# Patient Record
Sex: FEMALE | Race: WHITE
[De-identification: names, ages, dates, MRNs, and addresses within clinical notes are randomized per-mention and may not be internally consistent; named-entity substitution may affect disease eponyms.]

---

## 2018-04-04 ENCOUNTER — HOSPITAL ENCOUNTER (OUTPATIENT)
Dept: HOSPITAL 61 - US | Age: 83
Discharge: HOME | End: 2018-04-04
Attending: INTERNAL MEDICINE
Payer: MEDICARE

## 2018-04-04 DIAGNOSIS — N28.1: Primary | ICD-10-CM

## 2018-04-04 DIAGNOSIS — I10: ICD-10-CM

## 2018-04-04 DIAGNOSIS — E78.5: ICD-10-CM

## 2018-04-04 PROCEDURE — 76700 US EXAM ABDOM COMPLETE: CPT

## 2018-07-26 VITALS
SYSTOLIC BLOOD PRESSURE: 154 MMHG | DIASTOLIC BLOOD PRESSURE: 64 MMHG | SYSTOLIC BLOOD PRESSURE: 154 MMHG | DIASTOLIC BLOOD PRESSURE: 64 MMHG | SYSTOLIC BLOOD PRESSURE: 154 MMHG | SYSTOLIC BLOOD PRESSURE: 154 MMHG | DIASTOLIC BLOOD PRESSURE: 64 MMHG

## 2018-12-14 ENCOUNTER — HOSPITAL ENCOUNTER (OUTPATIENT)
Dept: HOSPITAL 61 - US | Age: 83
Discharge: HOME | End: 2018-12-14
Attending: INTERNAL MEDICINE
Payer: MEDICARE

## 2018-12-14 DIAGNOSIS — N28.1: Primary | ICD-10-CM

## 2018-12-14 DIAGNOSIS — I12.9: ICD-10-CM

## 2018-12-14 DIAGNOSIS — Z79.899: ICD-10-CM

## 2018-12-14 DIAGNOSIS — N18.3: ICD-10-CM

## 2018-12-14 PROCEDURE — 76770 US EXAM ABDO BACK WALL COMP: CPT

## 2018-12-14 NOTE — RAD
Indication: Chronic kidney disease stage III. Follow-up of right renal 

cyst ultrasound.

 

TECHNIQUE: Grayscale, color Doppler images of the kidneys.

 

COMPARISON: Ultrasound abdomen from 4/4/2018

 

FINDINGS:

There is a lobulated anechoic lesion in the upper pole of the right kidney

measuring 3.1 x 4.1 x 4.5 cm, previously 5.6 x 3.8 x 6.0 cm without 

internal vascularity, septations or solid mural nodularity. The right 

kidney demonstrates no hydronephrosis. The right kidney measures 10.2 x 

4.3 x 5.7 cm (longitudinal, AP, transverse).

 

The left kidney measures 10.6 x 5.3 x 4.3 cm without hydronephrosis but 

with diffusely thin cortex.

 

Visualized bladder within normal limits.

 

IMPRESSION:

1. Slight interval decrease in the size of right upper pole simple renal 

cyst.

2. Evidence of chronic kidney disease. No hydronephrosis.

 

Electronically signed by: Milo Russell DO (12/14/2018 3:15 PM) [facility]

## 2019-04-11 ENCOUNTER — HOSPITAL ENCOUNTER (OUTPATIENT)
Dept: HOSPITAL 61 - US | Age: 84
Discharge: HOME | End: 2019-04-11
Attending: INTERNAL MEDICINE
Payer: MEDICARE

## 2019-04-11 DIAGNOSIS — I08.1: Primary | ICD-10-CM

## 2019-04-11 DIAGNOSIS — I27.20: ICD-10-CM

## 2019-04-11 DIAGNOSIS — I25.10: ICD-10-CM

## 2019-04-11 DIAGNOSIS — I87.2: ICD-10-CM

## 2019-04-11 PROCEDURE — 93970 EXTREMITY STUDY: CPT

## 2019-04-11 PROCEDURE — 93306 TTE W/DOPPLER COMPLETE: CPT

## 2019-04-11 NOTE — RAD
MR#: [id number]

Account#: [id number]

Accession#: [id number]

Date of Study: 04/11/2019

Ordering Physician: EDWIN FAIRCHILD, 

Referring Physician: EDWIN FAIRCHILD, 

Tech: Lesa Tyler RDMS, YANGT, RTR





--------------- APPROVED REPORT --------------





Patient Location : OUT-PATIENT



Indications

Lower Extremity Edema :     Bilateral



Greater Saphenous Veins (GSV)

Significant venous relux noted in the RIGHT GSV at the following levels : Superficial Femoral Junctio
n, Proximal Thigh, Mid Thigh, Mid Thigh, Proximal Calf, Mid Calf, Distal Calf

Significant venous relux noted in the LEFT GSV at the following levels : Superficial Femoral Junction
, Proximal Thigh, Mid Thigh, Distal Thigh



Lesser Saphenous Veins (LSV)

Significant venous reflux is noted in the Right LSV.



Findings

Limited grayscale images of the saphenofemoral junctions do not reveal any obvious evidence of thromb
us.



The right great saphenous vein measures approximately 5.9 mm and has a reflux time of 2.7 seconds. Th
e left great saphenous vein measures approximately 6 mm and has a reflux time of 2.6 seconds.



The right small saphenous vein measures approximately 2.7 mm and has a reflux time of 4 seconds. The 
left lesser saphenous vein is not well visualized appears to be thrombosed.



Critical Notification

Critical Value: No



<Conclusion>

Positive for reflux in the bilateral greater and right lesser saphenous vein. The left lesser sapheno
us vein appears to be occluded.



Signed by : Jered Mendoza, 

Electronically Approved : 04/11/2019 10:18:41

## 2019-04-11 NOTE — RAD
MR#: [id number]

Account#: [id number]

Accession#: [id number]

Date of Study: 04/11/2019

Ordering Physician: EDWIN FAIRCHILD, 

Referring Physician: EDWIN FAIRCHILD, 

Tech: Yolanda Ramos RVT, [facility]





--------------- APPROVED REPORT --------------





Bilateral Lower Extremity Venous Study for CAD/ Leg Swelling

Patient Location: OUT-PATIENT



Indications

Bilateral LE Swelling



Vein Imaging (Right)

CFV (R): Compressible

SFJ (R): Compressible

FEM (R): Compressible

POP (R): Compressible

DFV (R): Compressible

PTV (R): Compressible

GSV (R): Compressible

Peroneals (R): Compressible



Vein Imaging (Left)

CFV (L): Compressible

SFJ (L): Compressible

FEM (L): Compressible

POP (L): Compressible

DFV (L): Compressible

PTV (L): Compressible

GSV (L): Compressible

Peroneals (L): Compressible



Doppler Evaluation (Right)

CFV (R): Phasic

POP (R):Phasic



Doppler Evaluation (Left)

CFV (L):Phasic

POP (L):Phasic



Findings

The bilateral lower extremity deep veins were evaluated for thrombus with color Doppler, spectral and
 grayscale images.



On the right the grayscale images of the common femoral, superficial femoral and popliteal veins do n
ot demonstrate any evidence of thrombus and these veins appear to be compressible. The below-knee vei
ns were not well visualized but grossly appear to be compressible. Spectral imaging and color Doppler
 do not reveal any evidence of obstruction to flow with normal respirophasic variation above the knee
. Below the knee there is spontaneous flow noted.



On the left, the grayscale images of the common femoral, superficial femoral and popliteal veins do n
ot demonstrate any evidence of thrombus and these veins appear to be compressible. The below-knee vei
ns again were not well visualized but grossly appear to be compressible. Spectral imaging and color D
oppler do not reveal any evidence of obstruction to flow with normal respirophasic variation above th
e knee. The below-knee veins demonstrate spontaneous flow.



Critical Notification

Critical Value: No



<Conclusion>

Negative for DVT in the bilateral lower extremities.



Signed by : Jered Mendoza, 

Electronically Approved : 04/11/2019 10:25:02

## 2019-04-11 NOTE — CARD
MR#: [id number]

Account#: [id number]

Accession#: [id number]

Date of Study: 04/11/2019

Ordering Physician: EDWIN FAIRCHILD, 

Referring Physician: EDWIN FAIRCHILD, 

Tech: Lu Mcallister [facility]





--------------- APPROVED REPORT --------------





EXAM: Two-dimensional and M-mode echocardiogram with Doppler and color Doppler.



Other Information 

Quality : GoodHR: 55bpm

Rhythm : Bradycardia



INDICATION

Congestive Heart Failure 



2D DIMENSIONS 

RVDd3.5 (2.9-3.5cm)Left Atrium(2D)2.5 (1.6-4.0cm)

IVSd0.9 (0.7-1.1cm)Aortic Root(2D)2.9 (2.0-3.7cm)

LVDd3.5 (3.9-5.9cm)LVOT Diameter1.9 (1.8-2.4cm)

PWd0.8 (0.7-1.1cm)LVDs2.4 (2.5-4.0cm)

FS (%) 30.2 %SV29.7 ml

LVEF(%)58.6 (>50%)



M-Mode DIMENSIONS 

Left Atrium(MM)3.74 (2.5-4.0cm)Aortic Root2.82 (2.2-3.7cm)



Aortic Valve

AoV Peak Jeffrey.111.1cm/sAoV VTI28.3cm

AO Peak GR.4.9mmHgLVOT Peak Jeffrey.101.9cm/s

AO Mean GR.2mmHgAVA (VMAX)2.54cm2

ELROY   (VTI)2.50cm2



Mitral Valve

MV E Uvupvgza80.0cm/sMV DECEL IFFC920ae

MV A Lwtvatcw78.5cm/sE/A  Ratio1.3



Pulmonary Valve

PV Peak Kofmctbd23.8cm/s



Tricuspid Valve

TR P. Mkcjfzse295np/sRAP EFAGBBDN6wiYt

TR Peak Gr.17yfKoCTSY71egJf



 LEFT VENTRICLE 

The left ventricle is normal size. There is normal left ventricular wall thickness. The left ventricu
lar systolic function is normal and the ejection fraction is within normal range. The Ejection Fracti
on is 55-60%. There is normal LV segmental wall motion. Transmitral Doppler flow pattern is Grade II-
pseudonormal filling dynamics.



 RIGHT VENTRICLE 

The right ventricle is mildly dilated. There is normal right ventricular wall thickness. The right ve
ntricular systolic function is normal.



 ATRIA 

The left atrium size is normal. The right atrium size is normal. The interatrial septum is intact wit
h no evidence for an atrial septal defect or patent foramen ovale as noted on 2-D or Doppler imaging.




 AORTIC VALVE 

The aortic valve is mildly calcified. The aortic valve is trileaflet. Doppler and Color Flow revealed
 no significant aortic regurgitation. There is no significant aortic valvular stenosis.



 MITRAL VALVE 

The mitral valve is calcified but opens well. There is no evidence of mitral valve prolapse. There is
 no mitral valve stenosis. Doppler and Color-flow revealed mild mitral regurgitation.



 TRICUSPID VALVE 

The tricuspid valve is normal in structure and function. Doppler and Color Flow revealed mild tricusp
id regurgitation.There is mild pulmonary hypertension.The PA pressure was estimated at 39 mmHg. There
 is no tricuspid valve prolapse or vegetation. There is no tricuspid valve stenosis.



 PULMONIC VALVE 

Doppler and Color Flow revealed trace pulmonic valvular regurgitation. There is no pulmonic valvular 
stenosis.



 GREAT VESSELS 

The aortic root is normal in size. The ascending aorta is normal in size. The IVC is normal in size a
nd collapses >50% with inspiration.



 PERICARDIAL EFFUSION 

There is no evidence of significant pericardial effusion.



Critical Notification

Critical Value: No



<Conclusion>

The left ventricular systolic function is normal and the ejection fraction is within normal range. Th
e Ejection Fraction is 55-60%.

There is normal LV segmental wall motion.

Doppler and Color Flow revealed mild tricuspid regurgitation.There is mild pulmonary hypertension.The
 PA pressure was estimated at 39 mmHg.



Signed by : Jered Mendoza, 

Electronically Approved : 04/11/2019 09:28:52

## 2021-04-23 ENCOUNTER — HOSPITAL ENCOUNTER (OUTPATIENT)
Dept: HOSPITAL 61 - LAB | Age: 86
End: 2021-04-23
Attending: SURGERY
Payer: MEDICARE

## 2021-04-23 DIAGNOSIS — Z01.812: Primary | ICD-10-CM

## 2021-04-23 DIAGNOSIS — R92.8: ICD-10-CM

## 2021-04-23 DIAGNOSIS — Z20.822: ICD-10-CM

## 2021-04-23 PROCEDURE — U0003 INFECTIOUS AGENT DETECTION BY NUCLEIC ACID (DNA OR RNA); SEVERE ACUTE RESPIRATORY SYNDROME CORONAVIRUS 2 (SARS-COV-2) (CORONAVIRUS DISEASE [COVID-19]), AMPLIFIED PROBE TECHNIQUE, MAKING USE OF HIGH THROUGHPUT TECHNOLOGIES AS DESCRIBED BY CMS-2020-01-R: HCPCS

## 2021-04-28 ENCOUNTER — HOSPITAL ENCOUNTER (OUTPATIENT)
Dept: HOSPITAL 61 - SURG | Age: 86
Discharge: HOME | End: 2021-04-28
Attending: SURGERY
Payer: MEDICARE

## 2021-04-28 ENCOUNTER — HOSPITAL ENCOUNTER (OUTPATIENT)
Dept: HOSPITAL 61 - MAMMO | Age: 86
Discharge: HOME | End: 2021-04-28
Attending: SURGERY
Payer: MEDICARE

## 2021-04-28 VITALS — WEIGHT: 136.69 LBS | HEIGHT: 64 IN | BODY MASS INDEX: 23.34 KG/M2

## 2021-04-28 VITALS — DIASTOLIC BLOOD PRESSURE: 74 MMHG | DIASTOLIC BLOOD PRESSURE: 74 MMHG | SYSTOLIC BLOOD PRESSURE: 154 MMHG

## 2021-04-28 DIAGNOSIS — Z79.899: ICD-10-CM

## 2021-04-28 DIAGNOSIS — D05.11: ICD-10-CM

## 2021-04-28 DIAGNOSIS — I25.10: ICD-10-CM

## 2021-04-28 DIAGNOSIS — N63.10: Primary | ICD-10-CM

## 2021-04-28 DIAGNOSIS — R92.8: ICD-10-CM

## 2021-04-28 DIAGNOSIS — I11.0: ICD-10-CM

## 2021-04-28 DIAGNOSIS — E78.00: ICD-10-CM

## 2021-04-28 DIAGNOSIS — Z79.4: ICD-10-CM

## 2021-04-28 DIAGNOSIS — Z88.0: ICD-10-CM

## 2021-04-28 DIAGNOSIS — R92.8: Primary | ICD-10-CM

## 2021-04-28 DIAGNOSIS — Z79.82: ICD-10-CM

## 2021-04-28 DIAGNOSIS — N60.11: ICD-10-CM

## 2021-04-28 DIAGNOSIS — N60.01: ICD-10-CM

## 2021-04-28 DIAGNOSIS — Z85.828: ICD-10-CM

## 2021-04-28 DIAGNOSIS — Z88.1: ICD-10-CM

## 2021-04-28 DIAGNOSIS — Z90.710: ICD-10-CM

## 2021-04-28 DIAGNOSIS — Z98.890: ICD-10-CM

## 2021-04-28 DIAGNOSIS — E03.9: ICD-10-CM

## 2021-04-28 DIAGNOSIS — N63.10: ICD-10-CM

## 2021-04-28 DIAGNOSIS — Z88.2: ICD-10-CM

## 2021-04-28 DIAGNOSIS — N60.41: ICD-10-CM

## 2021-04-28 DIAGNOSIS — N60.21: ICD-10-CM

## 2021-04-28 DIAGNOSIS — K21.9: ICD-10-CM

## 2021-04-28 DIAGNOSIS — I50.9: ICD-10-CM

## 2021-04-28 PROCEDURE — 19125 EXCISION BREAST LESION: CPT

## 2021-04-28 PROCEDURE — A6254 ABSORPT DRG <=16 SQ IN W/BDR: HCPCS

## 2021-04-28 PROCEDURE — A4364 ADHESIVE, LIQUID OR EQUAL: HCPCS

## 2021-04-28 PROCEDURE — 88305 TISSUE EXAM BY PATHOLOGIST: CPT

## 2021-04-28 PROCEDURE — A4452 WATERPROOF TAPE: HCPCS

## 2021-04-28 PROCEDURE — 88342 IMHCHEM/IMCYTCHM 1ST ANTB: CPT

## 2021-04-28 PROCEDURE — 19285 PERQ DEV BREAST 1ST US IMAG: CPT

## 2021-04-28 PROCEDURE — 77065 DX MAMMO INCL CAD UNI: CPT

## 2021-04-28 PROCEDURE — 88341 IMHCHEM/IMCYTCHM EA ADD ANTB: CPT

## 2021-04-28 PROCEDURE — A6402 STERILE GAUZE <= 16 SQ IN: HCPCS

## 2021-04-28 PROCEDURE — A6258 TRANSPARENT FILM >16<=48 IN: HCPCS

## 2021-04-28 NOTE — PDOC4
Operative Note


Operative Note


Operative Note:





Preoperative Diagnosis: Right breast mass x2





Postoperative Diagnosis: Same





Procedure: Right excisional breast biopsy with needle localization





Surgeon: Dariusz





Assistant:  Zoila GRADY





Anesthesia: General





EBL: 10 mL





Specimen: Right breast biopsy to pathology





Drains: None





Complications: None





Indication: The patient is an 88-year-old female who is referred following 

recent mammographic and sonographic evaluation of the right breast.  This 

identified to retroareolar small nodules consistent with intraductal papillomas.

 Recommendation was made for biopsy for definitive tissue diagnosis.  The risks 

of surgery were discussed with the patient which include bleeding, infection, 

pain, scar tissue, wound healing problems, anesthetic risk, potential need for 

additional surgery procedure.  She understands and would like to proceed





Description: The patient initially went to radiology where she underwent needle 

localization of the involved area.  She was then brought to the operating room 

and placed supine in the operating table.  General anesthesia was performed.  

The right breast was prepped with ChloraPrep and draped in a standard surgical 

manner.  The wire was entering near the lateral aspect a few centimeters from 

the nipple.  A curved lateral periareolar incision was made with a scalpel.  Cau

benjamin dissection was carried down to the breast parenchyma.  The wire was 

identified and followed until its distal tip.  A combination of sharp and 

cautery dissection was used.  The wire terminated in the superficial 

retroareolar tissues.  We were able to elevate the skin of the nipple and 

mobilize the localized tissues.  Cautery was used for full excision of the 

involved tissue which was sent to pathology.  Hemostasis was achieved with 

cautery.  The skin was approximated with 4-0 Monocryl and infiltrated with half 

percent Marcaine with epinephrine.  Steri-Strips and a sterile dressing were 

applied.  The patient tolerated the procedure well and was sent to the recovery 

room in stable condition.  At the end of the case all counts were correct.











MARICHUY GONZALEZ MD             Apr 28, 2021 12:51

## 2021-04-28 NOTE — RAD
EXAM: Sonographic guided right breast needle-wire localization; right breast post localization mammog
john.



HISTORY: 80-year-old female presents for needle-wire localization of suspected papilloma is within th
e right breast demonstrated on a prior sonogram.



TECHNIQUE: The risks of the procedure were discussed with the patient and written and and verbal cons
ent was obtained. A timeout was performed. Significant imaging of the right breast was performed and 
the dilated ducts containing solid-appearing nodules within the subareolar location were identified. 
The skin in this location was sterilely prepped, draped and infiltrated with 1 percent lidocaine. A n
eedle wire system was advanced through both nodules using sonographic guidance and the wire was deplo
yed. The wire secured to the skin surface. A post biopsy mammogram confirms positioning of the wire w
ithin the 9:00 subareolar aspect of the right breast. The patient tolerated the procedure without com
plication and was transferred to the operative suite in stable condition.



IMPRESSION:

1. Successful sonographic guided needle-wire localization of small solid-appearing nodules within dil
ated ducts within the 9:00 subareolar aspect of the right breast.

2. Note is made that there are several additional small solid-appearing nodules within dilated ducts 
throughout the cerebral aspect of the right breast, the appearance of which favors papillomatosis. Th
e superimposed on intraductal debris. The largest nodules demonstrated on the prior sonogram were tar
geted for localization and tissue sampling.



Electronically signed by: Jessica Lou MD (4/28/2021 3:24 PM) [id number]

## 2021-04-28 NOTE — DISCH
DISCHARGE INSTRUCTIONS


Condition on Discharge


Condition on Discharge:  Stable





Activity After Discharge


Activity Instructions for Disc:  Resume previous activity





Diet after Discharge


Diet after Discharge:  Regular





Wound Incision Care


Wound/Incision Care:  Other, see below (keep dressing clean and dry)





Follow-Up


Follow up with:  Dr Gonzalez in 1 week in office, call for appointment 

[phone]











MARICHUY GONZALEZ MD             Apr 28, 2021 12:53

## 2021-04-28 NOTE — RAD
EXAM: Sonographic guided right breast needle-wire localization; right breast post localization mammog
john.



HISTORY: 80-year-old female presents for needle-wire localization of suspected papilloma is within th
e right breast demonstrated on a prior sonogram.



TECHNIQUE: The risks of the procedure were discussed with the patient and written and and verbal cons
ent was obtained. A timeout was performed. Significant imaging of the right breast was performed and 
the dilated ducts containing solid-appearing nodules within the subareolar location were identified. 
The skin in this location was sterilely prepped, draped and infiltrated with 1 percent lidocaine. A n
eedle wire system was advanced through both nodules using sonographic guidance and the wire was deplo
yed. The wire secured to the skin surface. A post biopsy mammogram confirms positioning of the wire w
ithin the 9:00 subareolar aspect of the right breast. The patient tolerated the procedure without com
plication and was transferred to the operative suite in stable condition.



IMPRESSION:

1. Successful sonographic guided needle-wire localization of small solid-appearing nodules within dil
ated ducts within the 9:00 subareolar aspect of the right breast.

2. Note is made that there are several additional small solid-appearing nodules within dilated ducts 
throughout the cerebral aspect of the right breast, the appearance of which favors papillomatosis. Th
e superimposed on intraductal debris. The largest nodules demonstrated on the prior sonogram were tar
geted for localization and tissue sampling.



Electronically signed by: Jessica Lou MD (4/28/2021 3:24 PM) [id number]

## 2021-05-06 NOTE — PATHOLOGY
[facility] Accession Number: [id number]

.                                                                01

Material submitted:                                        .

breast - RIGHT BREAST BIOPSY. Modifiers: right

.                                                                01

Clinical history:                                          .

ABNORMAL MAMMOGRAM

RIGHT BREAST BIOPSY WITH NEEDLE LOCALIZATION

.                                                                02

**********************************************************************

Diagnosis:

Breast "right", biopsy with needle localization:

  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

  - DCIS INVOLVES RESECTION MARGIN.

  - Fibrocystic changes including cyst formation, sclerosing adenosis, and

     duct ectasia.

  - Coarse microcalcifications associated with DCIS.

  - Please see cancer summary below.

.

SURGICAL PATHOLOGY CANCER CASE SUMMARY

Breast DCIS (V 1.0.0.1)

.

Procedure:  Biopsy with needle localization

Specimen Laterality:  Right

Tumor Site:  Not specified

Histologic Type:  Ductal carcinoma in situ (DCIS)

Architectural Patterns:  Cribriform, micropapillary, solid

Nuclear Grade:  Grade II (intermediate)

Necrosis:  Not identified

Microcalcifications:  Present in DCIS

Biomarker:  Not performed.

(MLK:pit; 05/05/2021)

QTP  05/05/2021  1457 Local

**********************************************************************

.                                                                02

Comment:

The case is seen in co-review with Dr. Rohit Hines who concurs with

the above diagnosis.

.

The case was prepared and proofread by Dr. Johnson and electronically

released by Dr. Kerley.

.                                                                02

Electronically signed:                                     .

Spencer Wells Kerley, MD, Pathologist

NPI- [id number]

.                                                                01

Gross description:                                         .

Fixative: Formalin

Labeled: R breast biopsy

Specimen received: Unoriented portion of yellow-tan tissue

Oriented: No

Dimensions: 2.7 x 2.7 x 1.3 cm

Weight: 3 g

The specimen is inked black.

Number of slices: 7

Lesion: 2.5 x 1.3 x 0.7 cm area of tan-white fibrous/cystic tissue

Lesion location: Slices 2-6

The lesion grossly abuts the inked margin.

Biopsy clip: None identified

Uninvolved breast parenchyma: Yellow and lobulated

.

The specimen is submitted as follows:

A1 slice 1, perpendicular sections

A2 slice 2-3

A3 slice 4-5

A4 slice 6

A5 slice 7, perpendicular sections

.

The specimen is removed from the patient at 1220 and placed in formalin at

1225 on 4/28/2021. The specimen is removed from formalin at 1850 on

4/29/2021. The specimen is in formalin for greater than 6 hours and less

than 72 hours. ([facility]; 4/29/2021)

[facility]  04/29/2021  0857 Local

.                                                                02

Microscopic:                                                    .

Immunohistochemical stain results (properly controlled)

  P63(A1, A2, A3, A4, A5) - Highlights myoepithelial cells, and notable

                             absence of myoepithelial cells in areas of

                             DCIS.

  Myosin(A1, A2, A3, A4, A5) - Highlights myoepithelial cells, notable

                                absence of myoepithelial cells in areas

                                of DCIS.

  E-cadherin (A1, A2, A3, A4, A5) - Highlights ductal epithelial cells.

(MLK:pit; 05/05/2021)

.                                                                02

Pathologist provided ICD-10:

D05.11, N60.11, N60.01, N60.21, N60.41

.                                                                02

CPT                                                        .

604878, [id number], [id number]

Specimen Comment: A courtesy copy of this report has been sent to [phone]

Specimen Comment: Report sent to 

***Performed at:  01

   [facility]

   [address], [place]  [id number]

   MD Ethan Vogel MD Phone:  [phone]

***Performed at:  02

   [facility]

   [address]  [id number]

   MD Spencer Kerley MD Phone:  [phone]

## 2021-06-20 ENCOUNTER — HOSPITAL ENCOUNTER (EMERGENCY)
Dept: HOSPITAL 61 - ER | Age: 86
Discharge: STILL ADMITTED OR ADMINISTRATIVE | End: 2021-06-20
Payer: MEDICARE

## 2021-06-20 VITALS — WEIGHT: 130.29 LBS | HEIGHT: 63 IN | BODY MASS INDEX: 23.09 KG/M2

## 2021-06-20 DIAGNOSIS — E78.00: ICD-10-CM

## 2021-06-20 DIAGNOSIS — E03.9: ICD-10-CM

## 2021-06-20 DIAGNOSIS — I10: Primary | ICD-10-CM

## 2021-06-20 DIAGNOSIS — Z88.2: ICD-10-CM

## 2021-06-20 DIAGNOSIS — K21.9: ICD-10-CM

## 2021-06-20 DIAGNOSIS — Z88.0: ICD-10-CM

## 2021-06-20 LAB
ALBUMIN SERPL-MCNC: 3.8 G/DL (ref 3.4–5)
ALBUMIN/GLOB SERPL: 0.9 {RATIO} (ref 1–1.7)
ALP SERPL-CCNC: 120 U/L (ref 46–116)
ALT SERPL-CCNC: 24 U/L (ref 14–59)
ANION GAP SERPL CALC-SCNC: 8 MMOL/L (ref 6–14)
APTT PPP: YELLOW S
AST SERPL-CCNC: 29 U/L (ref 15–37)
BACTERIA #/AREA URNS HPF: 0 /HPF
BASOPHILS # BLD AUTO: 0 X10^3/UL (ref 0–0.2)
BASOPHILS NFR BLD: 1 % (ref 0–3)
BILIRUB SERPL-MCNC: 0.4 MG/DL (ref 0.2–1)
BILIRUB UR QL STRIP: NEGATIVE
BUN SERPL-MCNC: 16 MG/DL (ref 7–20)
BUN/CREAT SERPL: 18 (ref 6–20)
CALCIUM SERPL-MCNC: 9.1 MG/DL (ref 8.5–10.1)
CHLORIDE SERPL-SCNC: 103 MMOL/L (ref 98–107)
CO2 SERPL-SCNC: 31 MMOL/L (ref 21–32)
CREAT SERPL-MCNC: 0.9 MG/DL (ref 0.6–1)
EOSINOPHIL NFR BLD: 0.2 X10^3/UL (ref 0–0.7)
EOSINOPHIL NFR BLD: 5 % (ref 0–3)
ERYTHROCYTE [DISTWIDTH] IN BLOOD BY AUTOMATED COUNT: 13.1 % (ref 11.5–14.5)
FIBRINOGEN PPP-MCNC: CLEAR MG/DL
GFR SERPLBLD BASED ON 1.73 SQ M-ARVRAT: 59.1 ML/MIN
GLUCOSE SERPL-MCNC: 109 MG/DL (ref 70–99)
HCT VFR BLD CALC: 38.3 % (ref 36–47)
HGB BLD-MCNC: 13.1 G/DL (ref 12–15.5)
LYMPHOCYTES # BLD: 1 X10^3/UL (ref 1–4.8)
LYMPHOCYTES NFR BLD AUTO: 21 % (ref 24–48)
MCH RBC QN AUTO: 35 PG (ref 25–35)
MCHC RBC AUTO-ENTMCNC: 34 G/DL (ref 31–37)
MCV RBC AUTO: 102 FL (ref 79–100)
MONO #: 0.5 X10^3/UL (ref 0–1.1)
MONOCYTES NFR BLD: 11 % (ref 0–9)
NEUT #: 2.9 X10^3/UL (ref 1.8–7.7)
NEUTROPHILS NFR BLD AUTO: 62 % (ref 31–73)
NITRITE UR QL STRIP: NEGATIVE
PH UR STRIP: 7 [PH]
PLATELET # BLD AUTO: 206 X10^3/UL (ref 140–400)
POTASSIUM SERPL-SCNC: 4.1 MMOL/L (ref 3.5–5.1)
PROT SERPL-MCNC: 8 G/DL (ref 6.4–8.2)
PROT UR STRIP-MCNC: NEGATIVE MG/DL
RBC # BLD AUTO: 3.76 X10^6/UL (ref 3.5–5.4)
RBC #/AREA URNS HPF: (no result) /HPF (ref 0–2)
SODIUM SERPL-SCNC: 142 MMOL/L (ref 136–145)
UROBILINOGEN UR-MCNC: 0.2 MG/DL
WBC # BLD AUTO: 4.7 X10^3/UL (ref 4–11)
WBC #/AREA URNS HPF: (no result) /HPF (ref 0–4)

## 2021-06-20 PROCEDURE — 36415 COLL VENOUS BLD VENIPUNCTURE: CPT

## 2021-06-20 PROCEDURE — 93005 ELECTROCARDIOGRAM TRACING: CPT

## 2021-06-20 PROCEDURE — 81001 URINALYSIS AUTO W/SCOPE: CPT

## 2021-06-20 PROCEDURE — 96376 TX/PRO/DX INJ SAME DRUG ADON: CPT

## 2021-06-20 PROCEDURE — 85025 COMPLETE CBC W/AUTO DIFF WBC: CPT

## 2021-06-20 PROCEDURE — 96374 THER/PROPH/DIAG INJ IV PUSH: CPT

## 2021-06-20 PROCEDURE — 80053 COMPREHEN METABOLIC PANEL: CPT

## 2021-06-20 PROCEDURE — 84484 ASSAY OF TROPONIN QUANT: CPT

## 2021-06-20 PROCEDURE — 99285 EMERGENCY DEPT VISIT HI MDM: CPT

## 2021-06-20 NOTE — PHYS DOC
Past Medical History


Past Medical History:  GERD, High Cholesterol, Hypertension, Hypothyroid, Other


Additional Past Medical Histor:  SHINGLES, CELLULITIS


Past Surgical History:  Other


Additional Past Surgical Histo:  LIVER SURGERY,BASAL CELL CA NOSE,HEART CATH,


Smoking Status:  Never Smoker


Alcohol Use:  None


Drug Use:  None





General Adult


EDM:


Chief Complaint:  HYPERTENSION





HPI:


HPI:





Patient is a 88  year old female past medical history hypothyroid presents with 

a chief complaint of hypertension.  Prior to arrival patient was watching the 

[place] game and decided to take her blood pressure.  Blood pressure on the 

monitor was greater than 200/100 systolic.  She denied any associated symptoms 

such as headache chest pain or shortness of breath.  Patient's initial blood 

pressure shortly after arrival was 170 systolic.  At the time of my exam patient

blood pressure was again elevated at 220/120 systolic.





Review of Systems:


Constitutional symptoms-  No fever, no chills.


Eyes- No Discharge, No Visual Loss


Respiratory symptoms-  No shortness of breath, No wheezing, No Dyspnea on 

Exertion


Cardiovascular Systems; No chest pain, No Palpitations, No syncope


Gastrointestinal symptoms:  NO abdominal pain, no nausea, no vomiting or 

diarrhea.


Genitourinary symptoms:  No dysuria.  


Musculoskeletal symptoms:  No back pain  No extremity pain.


NEUROLOGICAL Symptoms:  No headache, no generalized weakness; No focal Weakness


Skin: No rash.





Heart Score:


C/O Chest Pain:  N/A


Risk Factors:


Risk Factors:  DM, Current or recent (<one month) smoker, HTN, HLP, family 

history of CAD, obesity.


Risk Scores:


Score 0 - 3:  2.5% MACE over next 6 weeks - Discharge Home


Score 4 - 6:  20.3% MACE over next 6 weeks - Admit for Clinical Observation


Score 7 - 10:  72.7% MACE over next 6 weeks - Early Invasive Strategies





Current Medications:





Current Medications








 Medications


  (Trade)  Dose


 Ordered  Sig/Tristan  Start Time


 Stop Time Status Last Admin


Dose Admin


 


 Sodium Chloride  1,000 ml @ 


 1,000 mls/hr  1X  ONCE  6/20/21 18:30


 6/20/21 19:29   














Allergies:


Allergies:





Allergies








Coded Allergies Type Severity Reaction Last Updated Verified


 


  Penicillins Allergy Intermediate HIVES 4/28/21 Yes


 


  Sulfa (Sulfonamide Antibiotics) Allergy Intermediate HIVES/RASH 4/28/21 Yes











Physical Exam:


PE:


General: alert, no acute distress.


Skin: warm, dry and intact.


HENT: bilateral external ears normal, oropharynx moist, nose normal.


Head::  Normocephalic, atraumatic.


Neck:   Trachea midline.


Eyes: EOMI, Normal conjunctiva, No drainage


CARDIOVASCULAR:  Regular rate and rhythm


RESPIRATORY:  No respiratory distress


Back:  Full range of motion.


Skin: Warm, dry, no erythema, no rash. 


MUSCULOSKELETAL:  Full range of motion of bilateral upper and lower extremities.


GASTROINTESTINAL: Abdomen soft without rebound or guarding.


NEUROLOGICAL:  Alert and noted to person, place and time.  No neurological 

deficits observed


Psychiatric:  Cooperative.  Normal judgment





Current Patient Data:


Labs:





                                Laboratory Tests








Test


 6/20/21


18:06


 


Urine Collection Type Unknown  


 


Urine Color Yellow  


 


Urine Clarity Clear  


 


Urine pH


 7.0 (<5.0-8.0)





 


Urine Specific Gravity


 <=1.005


(1.000-1.030)


 


Urine Protein


 Negative mg/dL


(NEG-TRACE)


 


Urine Glucose (UA)


 Negative mg/dL


(NEG)


 


Urine Ketones (Stick)


 Negative mg/dL


(NEG)


 


Urine Blood Small (NEG)  


 


Urine Nitrite


 Negative (NEG)





 


Urine Bilirubin


 Negative (NEG)





 


Urine Urobilinogen Dipstick


 0.2 mg/dL (0.2


mg/dL)


 


Urine Leukocyte Esterase


 Negative (NEG)





 


Urine RBC


 3-5 /HPF (0-2)





 


Urine WBC


 Occ /HPF (0-4)





 


Urine Squamous Epithelial


Cells Occ /LPF  





 


Urine Bacteria


 0 /HPF (0-FEW)














EKG:


EKG:





Performed at 1927


Rate 65


Normal sinus rhythm


No ST elevation


No ST depression


No acute MI


[]





Radiology/Procedures:


Radiology/Procedures:


[]





Course & Med Decision Making:


Course & Med Decision Making


Pertinent Labs and Imaging studies reviewed. (See chart for details)





[] Patient was evaluated for chief complaint.  Work-up consisted of laboratory 

analysis.  Treatment included labetalol 20 mg IV push.


Patient's blood pressure improved to the 160s systolic.  


Reevaluation after observation and patient's blood pressures in the 220s.  


Patient was then redosed with labetalol 40 mg.  


Post treatment patient's blood pressure is improved to the 170s.


After observation patient's blood pressure was briefly in the 80s systolic.





2200 hrs. blood pressure 130/60


Patient states she is uncomfortable going home and would like to be admitted


Due to alternating hypertension hypotension.


Patient continues to deny any discomfort.





Dragon Disclaimer:


Dragon Disclaimer:


This electronic medical record was generated, in whole or in part, using a voice

 recognition dictation system.





Departure


Departure


Impression:  


   Primary Impression:  


   Hypertension


Disposition:  09 ADMITTED AS INPATIENT


Admitting Physician:  HIMS


Condition:  STABLE


Referrals:  


LILLIG,SHAWN P MD (PCP)











JERAD JUAN DO            Jun 20, 2021 18:47

## 2021-06-21 VITALS
DIASTOLIC BLOOD PRESSURE: 67 MMHG | SYSTOLIC BLOOD PRESSURE: 133 MMHG | SYSTOLIC BLOOD PRESSURE: 133 MMHG | DIASTOLIC BLOOD PRESSURE: 67 MMHG

## 2021-06-21 VITALS — SYSTOLIC BLOOD PRESSURE: 165 MMHG | DIASTOLIC BLOOD PRESSURE: 74 MMHG

## 2021-06-21 VITALS — SYSTOLIC BLOOD PRESSURE: 164 MMHG | DIASTOLIC BLOOD PRESSURE: 81 MMHG

## 2021-06-21 NOTE — EKG
[facility]

              [address] [place] [address]

Test Date:    2021               Test Time:    19:27:52

Pat Name:     MARLO KIDD            Department:   

Patient ID:   [id number]           Room:          

Gender:       F                        Technician:   

:          [id number]               Requested By: JERAD JUAN

Order Number: [id number]           Reading MD:     

                                 Measurements

Intervals                              Axis          

Rate:         65                       P:            102

TX:           218                      QRS:          -21

QRSD:         78                       T:            21

QT:           518                                    

QTc:          545                                    

                           Interpretive Statements

SINUS RHYTHM

LEFTWARD AXIS

R-S TRANSITION ZONE IN V LEADS DISPLACED TO THE LEFT

PROLONGED QT

NO SPECIFIC ECG ABNORMALITIES

RI6.02

No previous ECG available for comparison

## 2021-06-21 NOTE — PDOC1
History and Physical


Date of Admission


Date of Admission


DATE: 6/21/21 


TIME: 07:10





Identification/Chief Complaint


Chief Complaint


Hypertension





Source


Source:  Chart review, Patient





History of Present Illness


History of Present Illness


Patient is a 88-year-old female with past medical history hypothyroidism, HLD, 

HTN, GERD, who presents to the ED with complaints of hypertension.  Patient 

states that she took her blood pressure yesterday  while watching a [employer] game 

and noted her blood pressure to be 200/100 mmHg.  She denies any associated 

headache, chest pain, or shortness of breath.  Upon arrival in the ED her blood 

pressure was noted to be 220/120 mmHg.  Labs on admission showed troponin 

<0.017, creatinine 0.9. She was treated with IV labetalol x2, with some 

resultant hypotension with SBP in the 80s.  Patient states she does not 

currently take any blood pressure medications, and she was taken off metoprolol 

due to bradycardia. She was admitted for further medical management.





Past Medical History


Cardiovascular:  HTN, Hyperlipidemia, Other


GI:  GERD


Endocrine:  Hypothyroidism





Past Surgical History


Past Surgical History:  Other (Liver surgery, left heart catheterization)





Family History


Family History:  High Cholestrol, Hypertension





Social History


Smoke:  No


ALCOHOL:  none


Drugs:  None





Current Problem List


Problem List


Problems


Medical Problems:


(1) Hypertension


Status: Acute  











Current Medications


Current Medications





Current Medications


Sodium Chloride 1,000 ml @  1,000 mls/hr 1X  ONCE IV ;  Start 6/20/21 at 18:30; 

Stop 6/20/21 at 18:44;  Status DC


Labetalol HCl (Normodyne Iv Push) 20 mg 1X  ONCE IVP  Last administered on 

6/20/21at 19:43;  Start 6/20/21 at 19:00;  Stop 6/20/21 at 19:01;  Status DC


Labetalol HCl (Normodyne Iv Push) 40 mg 1X  ONCE IVP  Last administered on 

6/20/21at 21:14;  Start 6/20/21 at 21:30;  Stop 6/20/21 at 21:31;  Status DC


Aspirin (Ecotrin) 81 mg DAILY PO ;  Start 6/21/21 at 09:00;  Status UNV


Levothyroxine Sodium (Synthroid) 50 mcg DAILYAC PO ;  Start 6/21/21 at 07:30;  

Status UNV


Non-Formulary Medication (Inulin (Fiber Gummies)) 1 tab DAILY PO ;  Start 

6/21/21 at 09:00;  Status UNV





Active Scripts


Active


Reported


Fiber Gummies (Inulin) 2 Gm Tab.chew 1 Tab PO DAILY 30 Days


Levothyroxine Sodium 50 Mcg Tablet 50 Mcg PO DAILYAC


Aspir 81 (Aspirin) 81 Mg Tablet.dr 1 Tab PO DAILY


Premarin (Estrogens, Conjugated) 0.3 Mg Tablet 1 Tab PO DAILY





Allergies


Allergies:  


Coded Allergies:  


     Penicillins (Verified  Allergy, Intermediate, HIVES, 4/28/21)


     Sulfa (Sulfonamide Antibiotics) (Verified  Allergy, Intermediate, 

HIVES/RASH, 4/28/21)





ROS


Review of System


GENERAL:  No history of weight change, weakness or fevers.


SKIN:  No bruising, hair changes or rashes.


EYES:  No blurred, double or loss of vision.


NOSE AND THROAT:  No history of nosebleeds, hoarseness or sore throat.


HEART: Hypertension.  Denies chest pain, denies palpitations.


LUNGS:  Denies cough, hemoptysis, wheezing or shortness of breath.


GASTROINTESTINAL: Denies nausea, vomiting, abdominal pain.


GENITOURINARY: Denies dysuria, frequency, urgency, hematuria.


NEUROLOGIC:  Denies history of numbness, tingling, tremor or weakness.


PSYCHIATRIC: Denies anxiety, denies depression.


ENDOCRINE:  No history of heat or cold intolerance, polyuria or polydipsia.


EXTREMITIES:  Denies muscle weakness, joint pain, pain on walking or stiffness.





Physical Exam


Physical Exam


General:  Alert, Oriented X3, Cooperative, No acute distress


HEENT:  PERRLA, EOMI


Lungs:  Clear to auscultation, Normal air movement


Heart:  RRR, no murmurs


Cardiovascular:  S1, S2


Abdomen:  Normal bowel sounds, Soft, No tenderness


Extremities:  No clubbing, No cyanosis


Skin:  No rashes, No significant lesion


Neuro:  Normal speech, Normal tone, Sensation intact


Psych/Mental Status:  Mental status NL, Mood NL





Vitals


Vitals





Vital Signs








  Date Time  Temp Pulse Resp B/P (MAP) Pulse Ox O2 Delivery O2 Flow Rate FiO2


 


6/21/21 02:41  57 15 165/78 (107) 97 Room Air  


 


6/20/21 18:00 98.2       





 98.2       











Labs


Labs





Laboratory Tests








Test


 6/20/21


18:06 6/20/21


19:30


 


Urine Collection Type Unknown  


 


Urine Color Yellow  


 


Urine Clarity Clear  


 


Urine pH 7.0 (<5.0-8.0)  


 


Urine Specific Gravity


 <=1.005


(1.000-1.030) 





 


Urine Protein


 Negative mg/dL


(NEG-TRACE) 





 


Urine Glucose (UA)


 Negative mg/dL


(NEG) 





 


Urine Ketones (Stick)


 Negative mg/dL


(NEG) 





 


Urine Blood Small (NEG)  


 


Urine Nitrite Negative (NEG)  


 


Urine Bilirubin Negative (NEG)  


 


Urine Urobilinogen Dipstick


 0.2 mg/dL (0.2


mg/dL) 





 


Urine Leukocyte Esterase Negative (NEG)  


 


Urine RBC 3-5 /HPF (0-2)  


 


Urine WBC Occ /HPF (0-4)  


 


Urine Squamous Epithelial


Cells Occ /LPF 


 





 


Urine Bacteria 0 /HPF (0-FEW)  


 


White Blood Count


 


 4.7 x10^3/uL


(4.0-11.0)


 


Red Blood Count


 


 3.76 x10^6/uL


(3.50-5.40)


 


Hemoglobin


 


 13.1 g/dL


(12.0-15.5)


 


Hematocrit


 


 38.3 %


(36.0-47.0)


 


Mean Corpuscular Volume


 


 102 fL


()


 


Mean Corpuscular Hemoglobin  35 pg (25-35) 


 


Mean Corpuscular Hemoglobin


Concent 


 34 g/dL


(31-37)


 


Red Cell Distribution Width


 


 13.1 %


(11.5-14.5)


 


Platelet Count


 


 206 x10^3/uL


(140-400)


 


Neutrophils (%) (Auto)  62 % (31-73) 


 


Lymphocytes (%) (Auto)  21 % (24-48) 


 


Monocytes (%) (Auto)  11 % (0-9) 


 


Eosinophils (%) (Auto)  5 % (0-3) 


 


Basophils (%) (Auto)  1 % (0-3) 


 


Neutrophils # (Auto)


 


 2.9 x10^3/uL


(1.8-7.7)


 


Lymphocytes # (Auto)


 


 1.0 x10^3/uL


(1.0-4.8)


 


Monocytes # (Auto)


 


 0.5 x10^3/uL


(0.0-1.1)


 


Eosinophils # (Auto)


 


 0.2 x10^3/uL


(0.0-0.7)


 


Basophils # (Auto)


 


 0.0 x10^3/uL


(0.0-0.2)


 


Sodium Level


 


 142 mmol/L


(136-145)


 


Potassium Level


 


 4.1 mmol/L


(3.5-5.1)


 


Chloride Level


 


 103 mmol/L


()


 


Carbon Dioxide Level


 


 31 mmol/L


(21-32)


 


Anion Gap  8 (6-14) 


 


Blood Urea Nitrogen


 


 16 mg/dL


(7-20)


 


Creatinine


 


 0.9 mg/dL


(0.6-1.0)


 


Estimated GFR


(Cockcroft-Gault) 


 59.1 





 


BUN/Creatinine Ratio  18 (6-20) 


 


Glucose Level


 


 109 mg/dL


(70-99)


 


Calcium Level


 


 9.1 mg/dL


(8.5-10.1)


 


Total Bilirubin


 


 0.4 mg/dL


(0.2-1.0)


 


Aspartate Amino Transf


(AST/SGOT) 


 29 U/L (15-37) 





 


Alanine Aminotransferase


(ALT/SGPT) 


 24 U/L (14-59) 





 


Alkaline Phosphatase


 


 120 U/L


()


 


Troponin I Quantitative


 


 < 0.017 ng/mL


(0.000-0.055)


 


Total Protein


 


 8.0 g/dL


(6.4-8.2)


 


Albumin


 


 3.8 g/dL


(3.4-5.0)


 


Albumin/Globulin Ratio  0.9 (1.0-1.7) 








Laboratory Tests








Test


 6/20/21


18:06 6/20/21


19:30


 


Urine Collection Type Unknown  


 


Urine Color Yellow  


 


Urine Clarity Clear  


 


Urine pH 7.0 (<5.0-8.0)  


 


Urine Specific Gravity


 <=1.005


(1.000-1.030) 





 


Urine Protein


 Negative mg/dL


(NEG-TRACE) 





 


Urine Glucose (UA)


 Negative mg/dL


(NEG) 





 


Urine Ketones (Stick)


 Negative mg/dL


(NEG) 





 


Urine Blood Small (NEG)  


 


Urine Nitrite Negative (NEG)  


 


Urine Bilirubin Negative (NEG)  


 


Urine Urobilinogen Dipstick


 0.2 mg/dL (0.2


mg/dL) 





 


Urine Leukocyte Esterase Negative (NEG)  


 


Urine RBC 3-5 /HPF (0-2)  


 


Urine WBC Occ /HPF (0-4)  


 


Urine Squamous Epithelial


Cells Occ /LPF 


 





 


Urine Bacteria 0 /HPF (0-FEW)  


 


White Blood Count


 


 4.7 x10^3/uL


(4.0-11.0)


 


Red Blood Count


 


 3.76 x10^6/uL


(3.50-5.40)


 


Hemoglobin


 


 13.1 g/dL


(12.0-15.5)


 


Hematocrit


 


 38.3 %


(36.0-47.0)


 


Mean Corpuscular Volume


 


 102 fL


()


 


Mean Corpuscular Hemoglobin  35 pg (25-35) 


 


Mean Corpuscular Hemoglobin


Concent 


 34 g/dL


(31-37)


 


Red Cell Distribution Width


 


 13.1 %


(11.5-14.5)


 


Platelet Count


 


 206 x10^3/uL


(140-400)


 


Neutrophils (%) (Auto)  62 % (31-73) 


 


Lymphocytes (%) (Auto)  21 % (24-48) 


 


Monocytes (%) (Auto)  11 % (0-9) 


 


Eosinophils (%) (Auto)  5 % (0-3) 


 


Basophils (%) (Auto)  1 % (0-3) 


 


Neutrophils # (Auto)


 


 2.9 x10^3/uL


(1.8-7.7)


 


Lymphocytes # (Auto)


 


 1.0 x10^3/uL


(1.0-4.8)


 


Monocytes # (Auto)


 


 0.5 x10^3/uL


(0.0-1.1)


 


Eosinophils # (Auto)


 


 0.2 x10^3/uL


(0.0-0.7)


 


Basophils # (Auto)


 


 0.0 x10^3/uL


(0.0-0.2)


 


Sodium Level


 


 142 mmol/L


(136-145)


 


Potassium Level


 


 4.1 mmol/L


(3.5-5.1)


 


Chloride Level


 


 103 mmol/L


()


 


Carbon Dioxide Level


 


 31 mmol/L


(21-32)


 


Anion Gap  8 (6-14) 


 


Blood Urea Nitrogen


 


 16 mg/dL


(7-20)


 


Creatinine


 


 0.9 mg/dL


(0.6-1.0)


 


Estimated GFR


(Cockcroft-Gault) 


 59.1 





 


BUN/Creatinine Ratio  18 (6-20) 


 


Glucose Level


 


 109 mg/dL


(70-99)


 


Calcium Level


 


 9.1 mg/dL


(8.5-10.1)


 


Total Bilirubin


 


 0.4 mg/dL


(0.2-1.0)


 


Aspartate Amino Transf


(AST/SGOT) 


 29 U/L (15-37) 





 


Alanine Aminotransferase


(ALT/SGPT) 


 24 U/L (14-59) 





 


Alkaline Phosphatase


 


 120 U/L


()


 


Troponin I Quantitative


 


 < 0.017 ng/mL


(0.000-0.055)


 


Total Protein


 


 8.0 g/dL


(6.4-8.2)


 


Albumin


 


 3.8 g/dL


(3.4-5.0)


 


Albumin/Globulin Ratio  0.9 (1.0-1.7) 











VTE Prophylaxis Ordered


VTE Prophylaxis Devices:  No


VTE Pharmacological Prophylaxi:  Yes





Assessment/Plan


Assessment/Plan


Hypertensive urgency


Hypothyroidism


GERD





Plan:


Patient treated in ED with labetalol with some resulting hypotension.


Remaining blood pressures have been relatively stable most recent 165/78 mmHg


She is still without complaints.  Patient states that she can follow-up with her

PCP within 1 week, and notes her cardiologist is Dr. Harris.


Resume home medications and will discharge today on chlorthalidone.


FEN - Cardiac diet


PPX  - Heparin


FULL CODE


Dispo - OBS for above





Justifications for Admission


Other Justification














TRUDI HAMILTON MD            Jun 21, 2021 07:25

## 2021-06-21 NOTE — PDOC3
Discharge Summary


Visit Information


Date of Admission:  Jun 21, 2021


Date of Discharge:  Jun 21, 2021


Final Diagnosis


Problems


Medical Problems:


(1) Hypertension


Status: Acute  











Brief Hospital Course


Allergies





                                    Allergies








Coded Allergies Type Severity Reaction Last Updated Verified


 


  Penicillins Allergy Intermediate HIVES 4/28/21 Yes


 


  Sulfa (Sulfonamide Antibiotics) Allergy Intermediate HIVES/RASH 4/28/21 Yes








Vital Signs





Vital Signs








  Date Time  Temp Pulse Resp B/P (MAP) Pulse Ox O2 Delivery O2 Flow Rate FiO2


 


6/21/21 08:29 98.7 65 16 165/74 (104) 97 Room Air  





 98.7       








Lab Results





Laboratory Tests








Test


 6/20/21


18:06 6/20/21


19:30


 


Urine Collection Type Unknown  


 


Urine Color Yellow  


 


Urine Clarity Clear  


 


Urine pH 7.0 (<5.0-8.0)  


 


Urine Specific Gravity


 <=1.005


(1.000-1.030) 





 


Urine Protein


 Negative mg/dL


(NEG-TRACE) 





 


Urine Glucose (UA)


 Negative mg/dL


(NEG) 





 


Urine Ketones (Stick)


 Negative mg/dL


(NEG) 





 


Urine Blood Small (NEG)  


 


Urine Nitrite Negative (NEG)  


 


Urine Bilirubin Negative (NEG)  


 


Urine Urobilinogen Dipstick


 0.2 mg/dL (0.2


mg/dL) 





 


Urine Leukocyte Esterase Negative (NEG)  


 


Urine RBC 3-5 /HPF (0-2)  


 


Urine WBC Occ /HPF (0-4)  


 


Urine Squamous Epithelial


Cells Occ /LPF 


 





 


Urine Bacteria 0 /HPF (0-FEW)  


 


White Blood Count


 


 4.7 x10^3/uL


(4.0-11.0)


 


Red Blood Count


 


 3.76 x10^6/uL


(3.50-5.40)


 


Hemoglobin


 


 13.1 g/dL


(12.0-15.5)


 


Hematocrit


 


 38.3 %


(36.0-47.0)


 


Mean Corpuscular Volume


 


 102 fL


()


 


Mean Corpuscular Hemoglobin  35 pg (25-35) 


 


Mean Corpuscular Hemoglobin


Concent 


 34 g/dL


(31-37)


 


Red Cell Distribution Width


 


 13.1 %


(11.5-14.5)


 


Platelet Count


 


 206 x10^3/uL


(140-400)


 


Neutrophils (%) (Auto)  62 % (31-73) 


 


Lymphocytes (%) (Auto)  21 % (24-48) 


 


Monocytes (%) (Auto)  11 % (0-9) 


 


Eosinophils (%) (Auto)  5 % (0-3) 


 


Basophils (%) (Auto)  1 % (0-3) 


 


Neutrophils # (Auto)


 


 2.9 x10^3/uL


(1.8-7.7)


 


Lymphocytes # (Auto)


 


 1.0 x10^3/uL


(1.0-4.8)


 


Monocytes # (Auto)


 


 0.5 x10^3/uL


(0.0-1.1)


 


Eosinophils # (Auto)


 


 0.2 x10^3/uL


(0.0-0.7)


 


Basophils # (Auto)


 


 0.0 x10^3/uL


(0.0-0.2)


 


Sodium Level


 


 142 mmol/L


(136-145)


 


Potassium Level


 


 4.1 mmol/L


(3.5-5.1)


 


Chloride Level


 


 103 mmol/L


()


 


Carbon Dioxide Level


 


 31 mmol/L


(21-32)


 


Anion Gap  8 (6-14) 


 


Blood Urea Nitrogen


 


 16 mg/dL


(7-20)


 


Creatinine


 


 0.9 mg/dL


(0.6-1.0)


 


Estimated GFR


(Cockcroft-Gault) 


 59.1 





 


BUN/Creatinine Ratio  18 (6-20) 


 


Glucose Level


 


 109 mg/dL


(70-99)


 


Calcium Level


 


 9.1 mg/dL


(8.5-10.1)


 


Total Bilirubin


 


 0.4 mg/dL


(0.2-1.0)


 


Aspartate Amino Transf


(AST/SGOT) 


 29 U/L (15-37) 





 


Alanine Aminotransferase


(ALT/SGPT) 


 24 U/L (14-59) 





 


Alkaline Phosphatase


 


 120 U/L


()


 


Troponin I Quantitative


 


 < 0.017 ng/mL


(0.000-0.055)


 


Total Protein


 


 8.0 g/dL


(6.4-8.2)


 


Albumin


 


 3.8 g/dL


(3.4-5.0)


 


Albumin/Globulin Ratio  0.9 (1.0-1.7) 








Laboratory Tests








Test


 6/20/21


18:06 6/20/21


19:30


 


Urine Collection Type Unknown  


 


Urine Color Yellow  


 


Urine Clarity Clear  


 


Urine pH 7.0 (<5.0-8.0)  


 


Urine Specific Gravity


 <=1.005


(1.000-1.030) 





 


Urine Protein


 Negative mg/dL


(NEG-TRACE) 





 


Urine Glucose (UA)


 Negative mg/dL


(NEG) 





 


Urine Ketones (Stick)


 Negative mg/dL


(NEG) 





 


Urine Blood Small (NEG)  


 


Urine Nitrite Negative (NEG)  


 


Urine Bilirubin Negative (NEG)  


 


Urine Urobilinogen Dipstick


 0.2 mg/dL (0.2


mg/dL) 





 


Urine Leukocyte Esterase Negative (NEG)  


 


Urine RBC 3-5 /HPF (0-2)  


 


Urine WBC Occ /HPF (0-4)  


 


Urine Squamous Epithelial


Cells Occ /LPF 


 





 


Urine Bacteria 0 /HPF (0-FEW)  


 


White Blood Count


 


 4.7 x10^3/uL


(4.0-11.0)


 


Red Blood Count


 


 3.76 x10^6/uL


(3.50-5.40)


 


Hemoglobin


 


 13.1 g/dL


(12.0-15.5)


 


Hematocrit


 


 38.3 %


(36.0-47.0)


 


Mean Corpuscular Volume


 


 102 fL


()


 


Mean Corpuscular Hemoglobin  35 pg (25-35) 


 


Mean Corpuscular Hemoglobin


Concent 


 34 g/dL


(31-37)


 


Red Cell Distribution Width


 


 13.1 %


(11.5-14.5)


 


Platelet Count


 


 206 x10^3/uL


(140-400)


 


Neutrophils (%) (Auto)  62 % (31-73) 


 


Lymphocytes (%) (Auto)  21 % (24-48) 


 


Monocytes (%) (Auto)  11 % (0-9) 


 


Eosinophils (%) (Auto)  5 % (0-3) 


 


Basophils (%) (Auto)  1 % (0-3) 


 


Neutrophils # (Auto)


 


 2.9 x10^3/uL


(1.8-7.7)


 


Lymphocytes # (Auto)


 


 1.0 x10^3/uL


(1.0-4.8)


 


Monocytes # (Auto)


 


 0.5 x10^3/uL


(0.0-1.1)


 


Eosinophils # (Auto)


 


 0.2 x10^3/uL


(0.0-0.7)


 


Basophils # (Auto)


 


 0.0 x10^3/uL


(0.0-0.2)


 


Sodium Level


 


 142 mmol/L


(136-145)


 


Potassium Level


 


 4.1 mmol/L


(3.5-5.1)


 


Chloride Level


 


 103 mmol/L


()


 


Carbon Dioxide Level


 


 31 mmol/L


(21-32)


 


Anion Gap  8 (6-14) 


 


Blood Urea Nitrogen


 


 16 mg/dL


(7-20)


 


Creatinine


 


 0.9 mg/dL


(0.6-1.0)


 


Estimated GFR


(Cockcroft-Gault) 


 59.1 





 


BUN/Creatinine Ratio  18 (6-20) 


 


Glucose Level


 


 109 mg/dL


(70-99)


 


Calcium Level


 


 9.1 mg/dL


(8.5-10.1)


 


Total Bilirubin


 


 0.4 mg/dL


(0.2-1.0)


 


Aspartate Amino Transf


(AST/SGOT) 


 29 U/L (15-37) 





 


Alanine Aminotransferase


(ALT/SGPT) 


 24 U/L (14-59) 





 


Alkaline Phosphatase


 


 120 U/L


()


 


Troponin I Quantitative


 


 < 0.017 ng/mL


(0.000-0.055)


 


Total Protein


 


 8.0 g/dL


(6.4-8.2)


 


Albumin


 


 3.8 g/dL


(3.4-5.0)


 


Albumin/Globulin Ratio  0.9 (1.0-1.7) 








Brief Hospital Course


Ms. Cline  is a 88 old female who presented with hypertensive urgency. Blood 

pressure was noted to be 220/120 mmHg.  Labs on admission showed troponin 

<0.017, creatinine 0.9. She was treated with IV labetalol x2, with some 

resultant hypotension with SBP in the 80s.  Patient states she does not 

currently take any blood pressure medications, and she was taken off metoprolol 

due to bradycardia.  Repeat blood pressure improved to 165/74 mmHg.  She was 

discharged on chlorthalidone and instructed to follow-up with her PCP within the

next 5-7 days.





Discharge Information


Condition at Discharge:  Stable


Follow Up:  Weeks


Disposition/Orders:  D/C to Home


Scheduled


Aspirin (Aspir 81) 81 Mg Tablet.dr, 1 TAB PO DAILY for heart health, (Reported)


   Entered as Reported by: DIOGO LORD on 9/26/15 1011


   Last Action: Continued on 6/21/21 0708 by TRUDI AHMILTON MD


Chlorthalidone (Chlorthalidone **) 25 Mg Tablet, 25 MG PO DAILY for DIURETIC, 

#30


   Prescribed by: TRUDI HAMILTON MD on 6/21/21 0907


Estrogens, Conjugated (Premarin) 0.3 Mg Tablet, 1 TAB PO DAILY for estogen 

replacement, (Reported)


   Entered as Reported by: DIOGO LORD on 9/26/15 1011


Inulin (Fiber Gummies) 2 Gm Tab.chew, 1 TAB PO DAILY for constipation for 30 

Days, #30 Ref 0 (Reported)


   Entered as Reported by: ROSALIA VARGAS on 4/26/21 1411


   Last Action: Converted on 6/21/21 0708 by TRUDI HAMILTON MD


Levothyroxine Sodium (Levothyroxine Sodium) 50 Mcg Tablet, 50 MCG PO DAILYAC for

THYROID SUPPLEMENT, (Reported)


   Entered as Reported by: MAXX ROLON on 6/4/19 0945


   Last Action: Continued on 6/21/21 0708 by TRUDI HAMILTON MD





Justicifation of Admission Dx:


Justifications for Admission:


Justification of Admission Dx:  Yes


Angina:  New-Onset











TRUDI HAMILTON MD            Jun 21, 2021 09:11

## 2021-09-07 ENCOUNTER — HOSPITAL ENCOUNTER (OUTPATIENT)
Dept: HOSPITAL 61 - US | Age: 86
End: 2021-09-07
Attending: INTERNAL MEDICINE
Payer: MEDICARE

## 2021-09-07 DIAGNOSIS — I87.2: Primary | ICD-10-CM

## 2021-09-07 DIAGNOSIS — R22.43: ICD-10-CM

## 2021-09-07 PROCEDURE — 93970 EXTREMITY STUDY: CPT

## 2021-09-07 NOTE — RAD
MR#: [id number]

Account#: [id number]

Accession#: [id number]

Date of Study: 09/07/2021

Ordering Physician: EDWIN FAIRCHILD, 

Referring Physician: EDWIN FAIRCHILD, 

Tech: Delfino Hinson MBA, RDMS, RVT, RDCS, RTR





--------------- APPROVED REPORT --------------





Patient Location : OUT-PATIENT



Indications

Lower Extremity Edema :     Bilateral



Greater Saphenous Veins (GSV)

Significant venous relux noted in the RIGHT GSV at the following levels : Superficial Femoral Junctio
n, Proximal Thigh, Mid Thigh, Distal Thigh, Proximal Calf, Mid Calf, Distal Calf

Significant venous relux noted in the LEFT GSV at the following levels : Superficial Femoral Junction
, Proximal Thigh, Mid Thigh, Distal Thigh, Proximal Calf, Mid Calf, Distal Calf



Lesser Saphenous Veins (LSV)

Significant venous reflux is noted in the Bilateral LSV.



Findings

Limited grayscale images the bilateral saphenofemoral junctions are grossly unremarkable.



On the right the great saphenous vein measures approximately 3.3 mm and has a maximum reflux time of 
2.8 seconds.  On the left the great saphenous vein measures 4.6 mm and has a maximum reflux time of 3
.2 seconds.



The right lesser saphenous vein measures 3.3 mm and has a reflux time of 3.2 seconds.  The left lesse
r saphenous vein measures 3.4 cm and absent maximal reflux time of 2.7 seconds.



Critical Notification

Critical Value: No



<Conclusion>

1.  Positive for reflux the bilateral greater and lesser saphenous veins



Signed by : Jered Mendoza, 

Electronically Approved : 09/07/2021 14:32:51

## 2021-09-07 NOTE — RAD
MR#: [id number]

Account#: [id number]

Accession#: [id number]

Date of Study: 09/07/2021

Ordering Physician: EDWIN FAIRCHILD, 

Referring Physician: EDWIN FAIRCHILD, 

Tech: Delfino Hinson MBA, RDMS, RVT, RDCS, RTR





--------------- APPROVED REPORT --------------





Bilateral Lower Extremity Venous Study for DVT

Patient Location: OUT-PATIENT



Indications

Lower Extremity Edema:     Bilateral                                                                 
                                                                 



Vein Imaging (Right)

CFV (R): Compressible

SFJ (R): Compressible

FEM (R): Compressible

POP (R): Compressible

DFV (R): Compressible

PTV (R): Spontaneous

GSV (R): Spontaneous

Peroneals (R): Spontaneous



Vein Imaging (Left)

CFV (L): Compressible

SFJ (L): Compressible

FEM (L): Compressible

POP (L): Compressible

DFV (L): Compressible

PTV (L): Spontaneous

GSV (L): Spontaneous

Peroneals (L): Spontaneous



Doppler Evaluation (Right)

CFV (R): Spontaneous

POP (R):Spontaneous



Doppler Evaluation (Left)

CFV (L):Spontaneous

POP (L):Spontaneous



Findings

The bilateral lower extremity deep veins were evaluated for thrombus with color Doppler, spectral and
 grayscale images.



On the right the grayscale images of the common femoral, superficial femoral and popliteal veins do n
ot demonstrate any evidence of thrombus and these veins appear to be compressible. The below-knee vei
ns were not well visualized but grossly appear to be compressible. Spectral imaging and color Doppler
 do not reveal any evidence of obstruction to flow with normal respirophasic variation above the knee
. Below the knee there is spontaneous flow noted.



On the left, the grayscale images of the common femoral, superficial femoral and popliteal veins do n
ot demonstrate any evidence of thrombus and these veins appear to be compressible. The below-knee vei
ns again were not well visualized but grossly appear to be compressible. Spectral imaging and color D
oppler do not reveal any evidence of obstruction to flow with normal respirophasic variation above th
e knee. The below-knee veins demonstrate spontaneous flow.



Critical Notification

Critical Value: No



<Conclusion>

1. Negative for DVT in the bilateral lower extremities. 



Signed by : Jered Mendoza, 

Electronically Approved : 09/07/2021 14:34:08